# Patient Record
Sex: MALE | Race: WHITE | Employment: FULL TIME | URBAN - METROPOLITAN AREA
[De-identification: names, ages, dates, MRNs, and addresses within clinical notes are randomized per-mention and may not be internally consistent; named-entity substitution may affect disease eponyms.]

---

## 2019-02-23 ENCOUNTER — HOSPITAL ENCOUNTER (EMERGENCY)
Age: 29
Discharge: HOME | End: 2019-02-23
Payer: COMMERCIAL

## 2019-02-23 VITALS
WEIGHT: 190 LBS | RESPIRATION RATE: 20 BRPM | OXYGEN SATURATION: 98 % | HEART RATE: 74 BPM | TEMPERATURE: 97.9 F | BODY MASS INDEX: 25.77 KG/M2 | SYSTOLIC BLOOD PRESSURE: 129 MMHG | DIASTOLIC BLOOD PRESSURE: 75 MMHG

## 2019-02-23 DIAGNOSIS — R42 DIZZINESS: Primary | ICD-10-CM

## 2019-02-23 LAB
BASOPHILS ABSOLUTE: 0.05 E9/L (ref 0–0.2)
BASOPHILS RELATIVE PERCENT: 0.5 % (ref 0–2)
CO2: 24 MMOL/L (ref 22–29)
EOSINOPHILS ABSOLUTE: 0.24 E9/L (ref 0.05–0.5)
EOSINOPHILS RELATIVE PERCENT: 2.5 % (ref 0–6)
GFR AFRICAN AMERICAN: >60
GFR NON-AFRICAN AMERICAN: >60 ML/MIN/1.73
GLUCOSE BLD-MCNC: 102 MG/DL (ref 74–99)
HCT VFR BLD CALC: 43.9 % (ref 37–54)
HEMOGLOBIN: 15.3 G/DL (ref 12.5–16.5)
IMMATURE GRANULOCYTES #: 0.02 E9/L
IMMATURE GRANULOCYTES %: 0.2 % (ref 0–5)
LYMPHOCYTES ABSOLUTE: 2.83 E9/L (ref 1.5–4)
LYMPHOCYTES RELATIVE PERCENT: 29.7 % (ref 20–42)
MCH RBC QN AUTO: 29.3 PG (ref 26–35)
MCHC RBC AUTO-ENTMCNC: 34.9 % (ref 32–34.5)
MCV RBC AUTO: 84.1 FL (ref 80–99.9)
MONOCYTES ABSOLUTE: 0.82 E9/L (ref 0.1–0.95)
MONOCYTES RELATIVE PERCENT: 8.6 % (ref 2–12)
NEUTROPHILS ABSOLUTE: 5.58 E9/L (ref 1.8–7.3)
NEUTROPHILS RELATIVE PERCENT: 58.5 % (ref 43–80)
PDW BLD-RTO: 12.6 FL (ref 11.5–15)
PLATELET # BLD: 286 E9/L (ref 130–450)
PMV BLD AUTO: 9.3 FL (ref 7–12)
POC ANION GAP: 12 MMOL/L (ref 7–16)
POC BUN: 19 MG/DL (ref 8–23)
POC CHLORIDE: 103 MMOL/L (ref 100–108)
POC CREATININE: 1.2 MG/DL (ref 0.7–1.2)
POC POTASSIUM: 4 MMOL/L (ref 3.5–5)
POC SODIUM: 139 MMOL/L (ref 132–146)
RBC # BLD: 5.22 E12/L (ref 3.8–5.8)
WBC # BLD: 9.5 E9/L (ref 4.5–11.5)

## 2019-02-23 PROCEDURE — 85025 COMPLETE CBC W/AUTO DIFF WBC: CPT

## 2019-02-23 PROCEDURE — 80051 ELECTROLYTE PANEL: CPT

## 2019-02-23 PROCEDURE — 36415 COLL VENOUS BLD VENIPUNCTURE: CPT

## 2019-02-23 PROCEDURE — 6370000000 HC RX 637 (ALT 250 FOR IP): Performed by: PHYSICIAN ASSISTANT

## 2019-02-23 PROCEDURE — 99212 OFFICE O/P EST SF 10 MIN: CPT

## 2019-02-23 PROCEDURE — 84520 ASSAY OF UREA NITROGEN: CPT

## 2019-02-23 PROCEDURE — 82565 ASSAY OF CREATININE: CPT

## 2019-02-23 PROCEDURE — 82947 ASSAY GLUCOSE BLOOD QUANT: CPT

## 2019-02-23 RX ORDER — MECLIZINE HCL 12.5 MG/1
25 TABLET ORAL ONCE
Status: COMPLETED | OUTPATIENT
Start: 2019-02-23 | End: 2019-02-23

## 2019-02-23 RX ORDER — PREDNISONE 10 MG/1
TABLET ORAL
Qty: 14 TABLET | Refills: 0 | Status: SHIPPED | OUTPATIENT
Start: 2019-02-23 | End: 2019-12-27 | Stop reason: ALTCHOICE

## 2019-02-23 RX ORDER — MECLIZINE HYDROCHLORIDE CHEWABLE TABLETS 25 MG/1
25 TABLET, CHEWABLE ORAL 3 TIMES DAILY PRN
Qty: 21 TABLET | Refills: 0 | Status: SHIPPED | OUTPATIENT
Start: 2019-02-23

## 2019-02-23 RX ORDER — MECLIZINE HYDROCHLORIDE CHEWABLE TABLETS 25 MG/1
25 TABLET, CHEWABLE ORAL 3 TIMES DAILY PRN
Qty: 90 TABLET | Refills: 0 | Status: SHIPPED | OUTPATIENT
Start: 2019-02-23 | End: 2019-02-23

## 2019-02-23 RX ADMIN — MECLIZINE 25 MG: 12.5 TABLET ORAL at 16:52

## 2019-12-27 ENCOUNTER — APPOINTMENT (OUTPATIENT)
Dept: GENERAL RADIOLOGY | Age: 29
End: 2019-12-27
Payer: COMMERCIAL

## 2019-12-27 ENCOUNTER — HOSPITAL ENCOUNTER (EMERGENCY)
Age: 29
Discharge: HOME | End: 2019-12-27
Payer: COMMERCIAL

## 2019-12-27 VITALS
BODY MASS INDEX: 27.12 KG/M2 | WEIGHT: 200 LBS | DIASTOLIC BLOOD PRESSURE: 84 MMHG | TEMPERATURE: 98 F | HEART RATE: 72 BPM | SYSTOLIC BLOOD PRESSURE: 129 MMHG | RESPIRATION RATE: 20 BRPM | OXYGEN SATURATION: 99 %

## 2019-12-27 DIAGNOSIS — R68.89 FLU-LIKE SYMPTOMS: Primary | ICD-10-CM

## 2019-12-27 LAB — STREP GRP A PCR: NEGATIVE

## 2019-12-27 PROCEDURE — 99213 OFFICE O/P EST LOW 20 MIN: CPT

## 2019-12-27 PROCEDURE — 87880 STREP A ASSAY W/OPTIC: CPT

## 2019-12-27 PROCEDURE — 71046 X-RAY EXAM CHEST 2 VIEWS: CPT

## 2019-12-27 PROCEDURE — 94640 AIRWAY INHALATION TREATMENT: CPT

## 2019-12-27 PROCEDURE — 6370000000 HC RX 637 (ALT 250 FOR IP): Performed by: NURSE PRACTITIONER

## 2019-12-27 RX ORDER — IPRATROPIUM BROMIDE AND ALBUTEROL SULFATE 2.5; .5 MG/3ML; MG/3ML
1 SOLUTION RESPIRATORY (INHALATION) ONCE
Status: COMPLETED | OUTPATIENT
Start: 2019-12-27 | End: 2019-12-27

## 2019-12-27 RX ORDER — ALBUTEROL SULFATE 90 UG/1
2 AEROSOL, METERED RESPIRATORY (INHALATION) EVERY 6 HOURS PRN
Qty: 1 INHALER | Refills: 0 | Status: SHIPPED | OUTPATIENT
Start: 2019-12-27 | End: 2020-12-26

## 2019-12-27 RX ORDER — PREDNISONE 20 MG/1
40 TABLET ORAL DAILY
Qty: 10 TABLET | Refills: 0 | Status: SHIPPED | OUTPATIENT
Start: 2019-12-27 | End: 2020-01-01

## 2019-12-27 RX ADMIN — IPRATROPIUM BROMIDE AND ALBUTEROL SULFATE 1 AMPULE: .5; 3 SOLUTION RESPIRATORY (INHALATION) at 12:17

## 2020-02-01 ENCOUNTER — HOSPITAL ENCOUNTER (OUTPATIENT)
Age: 30
Discharge: HOME | End: 2020-02-01
Payer: COMMERCIAL

## 2020-02-01 LAB
ALBUMIN SERPL-MCNC: 4.8 G/DL (ref 3.5–5.2)
ALP BLD-CCNC: 72 U/L (ref 40–129)
ALT SERPL-CCNC: 27 U/L (ref 0–40)
ANION GAP SERPL CALCULATED.3IONS-SCNC: 12 MMOL/L (ref 7–16)
AST SERPL-CCNC: 21 U/L (ref 0–39)
BASOPHILS ABSOLUTE: 0.04 E9/L (ref 0–0.2)
BASOPHILS RELATIVE PERCENT: 0.5 % (ref 0–2)
BILIRUB SERPL-MCNC: 0.5 MG/DL (ref 0–1.2)
BUN BLDV-MCNC: 15 MG/DL (ref 6–20)
CALCIUM SERPL-MCNC: 9.7 MG/DL (ref 8.6–10.2)
CHLORIDE BLD-SCNC: 103 MMOL/L (ref 98–107)
CHOLESTEROL, TOTAL: 170 MG/DL (ref 0–199)
CO2: 26 MMOL/L (ref 22–29)
CREAT SERPL-MCNC: 1.1 MG/DL (ref 0.7–1.2)
EOSINOPHILS ABSOLUTE: 0.24 E9/L (ref 0.05–0.5)
EOSINOPHILS RELATIVE PERCENT: 2.9 % (ref 0–6)
GFR AFRICAN AMERICAN: >60
GFR NON-AFRICAN AMERICAN: >60 ML/MIN/1.73
GLUCOSE BLD-MCNC: 93 MG/DL (ref 74–99)
HCT VFR BLD CALC: 44 % (ref 37–54)
HDLC SERPL-MCNC: 55 MG/DL
HEMOGLOBIN: 15 G/DL (ref 12.5–16.5)
IMMATURE GRANULOCYTES #: 0.03 E9/L
IMMATURE GRANULOCYTES %: 0.4 % (ref 0–5)
LDL CHOLESTEROL CALCULATED: 102 MG/DL (ref 0–99)
LYMPHOCYTES ABSOLUTE: 2.9 E9/L (ref 1.5–4)
LYMPHOCYTES RELATIVE PERCENT: 34.9 % (ref 20–42)
MCH RBC QN AUTO: 29.1 PG (ref 26–35)
MCHC RBC AUTO-ENTMCNC: 34.1 % (ref 32–34.5)
MCV RBC AUTO: 85.3 FL (ref 80–99.9)
MONOCYTES ABSOLUTE: 0.96 E9/L (ref 0.1–0.95)
MONOCYTES RELATIVE PERCENT: 11.5 % (ref 2–12)
NEUTROPHILS ABSOLUTE: 4.15 E9/L (ref 1.8–7.3)
NEUTROPHILS RELATIVE PERCENT: 49.8 % (ref 43–80)
PDW BLD-RTO: 13 FL (ref 11.5–15)
PLATELET # BLD: 230 E9/L (ref 130–450)
PMV BLD AUTO: 9.2 FL (ref 7–12)
POTASSIUM SERPL-SCNC: 4.3 MMOL/L (ref 3.5–5)
RBC # BLD: 5.16 E12/L (ref 3.8–5.8)
SODIUM BLD-SCNC: 141 MMOL/L (ref 132–146)
T4 FREE: 1.33 NG/DL (ref 0.93–1.7)
TOTAL PROTEIN: 7.1 G/DL (ref 6.4–8.3)
TRIGL SERPL-MCNC: 67 MG/DL (ref 0–149)
TSH SERPL DL<=0.05 MIU/L-ACNC: 1.77 UIU/ML (ref 0.27–4.2)
VITAMIN D 25-HYDROXY: 50 NG/ML (ref 30–100)
VLDLC SERPL CALC-MCNC: 13 MG/DL
WBC # BLD: 8.3 E9/L (ref 4.5–11.5)

## 2020-02-01 PROCEDURE — 85025 COMPLETE CBC W/AUTO DIFF WBC: CPT

## 2020-02-01 PROCEDURE — 36415 COLL VENOUS BLD VENIPUNCTURE: CPT

## 2020-02-01 PROCEDURE — 80053 COMPREHEN METABOLIC PANEL: CPT

## 2020-02-01 PROCEDURE — 82306 VITAMIN D 25 HYDROXY: CPT

## 2020-02-01 PROCEDURE — 80061 LIPID PANEL: CPT

## 2020-02-01 PROCEDURE — 84443 ASSAY THYROID STIM HORMONE: CPT

## 2020-02-01 PROCEDURE — 84439 ASSAY OF FREE THYROXINE: CPT

## 2022-12-08 ENCOUNTER — OFFICE VISIT (OUTPATIENT)
Dept: FAMILY MEDICINE CLINIC | Age: 32
End: 2022-12-08
Payer: COMMERCIAL

## 2022-12-08 VITALS
BODY MASS INDEX: 28.71 KG/M2 | TEMPERATURE: 97.2 F | HEIGHT: 72 IN | WEIGHT: 212 LBS | HEART RATE: 77 BPM | SYSTOLIC BLOOD PRESSURE: 112 MMHG | OXYGEN SATURATION: 99 % | RESPIRATION RATE: 16 BRPM | DIASTOLIC BLOOD PRESSURE: 72 MMHG

## 2022-12-08 DIAGNOSIS — E55.9 VITAMIN D DEFICIENCY: ICD-10-CM

## 2022-12-08 DIAGNOSIS — Z83.438 FAMILY HISTORY OF HYPERLIPIDEMIA: ICD-10-CM

## 2022-12-08 DIAGNOSIS — R53.83 FATIGUE, UNSPECIFIED TYPE: ICD-10-CM

## 2022-12-08 DIAGNOSIS — R68.82 DIMINISHED LIBIDO: ICD-10-CM

## 2022-12-08 DIAGNOSIS — Z00.00 ENCOUNTER FOR MEDICAL EXAMINATION TO ESTABLISH CARE: Primary | ICD-10-CM

## 2022-12-08 DIAGNOSIS — F33.0 MILD EPISODE OF RECURRENT MAJOR DEPRESSIVE DISORDER (HCC): ICD-10-CM

## 2022-12-08 DIAGNOSIS — Z23 FLU VACCINE NEED: ICD-10-CM

## 2022-12-08 LAB
ANION GAP SERPL CALCULATED.3IONS-SCNC: 11 MMOL/L (ref 7–16)
BASOPHILS ABSOLUTE: 0.05 E9/L (ref 0–0.2)
BASOPHILS RELATIVE PERCENT: 0.6 % (ref 0–2)
BUN BLDV-MCNC: 10 MG/DL (ref 6–20)
CALCIUM SERPL-MCNC: 9.8 MG/DL (ref 8.6–10.2)
CHLORIDE BLD-SCNC: 102 MMOL/L (ref 98–107)
CHOLESTEROL, TOTAL: 213 MG/DL (ref 0–199)
CO2: 24 MMOL/L (ref 22–29)
CREAT SERPL-MCNC: 1.1 MG/DL (ref 0.7–1.2)
EOSINOPHILS ABSOLUTE: 0.19 E9/L (ref 0.05–0.5)
EOSINOPHILS RELATIVE PERCENT: 2.3 % (ref 0–6)
GFR SERPL CREATININE-BSD FRML MDRD: >60 ML/MIN/1.73
GLUCOSE BLD-MCNC: 100 MG/DL (ref 74–99)
HCT VFR BLD CALC: 45.2 % (ref 37–54)
HDLC SERPL-MCNC: 60 MG/DL
HEMOGLOBIN: 15.4 G/DL (ref 12.5–16.5)
IMMATURE GRANULOCYTES #: 0.02 E9/L
IMMATURE GRANULOCYTES %: 0.2 % (ref 0–5)
LDL CHOLESTEROL CALCULATED: 139 MG/DL (ref 0–99)
LYMPHOCYTES ABSOLUTE: 2.39 E9/L (ref 1.5–4)
LYMPHOCYTES RELATIVE PERCENT: 28.4 % (ref 20–42)
MCH RBC QN AUTO: 28.9 PG (ref 26–35)
MCHC RBC AUTO-ENTMCNC: 34.1 % (ref 32–34.5)
MCV RBC AUTO: 85 FL (ref 80–99.9)
MONOCYTES ABSOLUTE: 0.82 E9/L (ref 0.1–0.95)
MONOCYTES RELATIVE PERCENT: 9.7 % (ref 2–12)
NEUTROPHILS ABSOLUTE: 4.95 E9/L (ref 1.8–7.3)
NEUTROPHILS RELATIVE PERCENT: 58.8 % (ref 43–80)
PDW BLD-RTO: 12.8 FL (ref 11.5–15)
PLATELET # BLD: 298 E9/L (ref 130–450)
PMV BLD AUTO: 10.4 FL (ref 7–12)
POTASSIUM SERPL-SCNC: 3.8 MMOL/L (ref 3.5–5)
RBC # BLD: 5.32 E12/L (ref 3.8–5.8)
SODIUM BLD-SCNC: 137 MMOL/L (ref 132–146)
TRIGL SERPL-MCNC: 71 MG/DL (ref 0–149)
VITAMIN D 25-HYDROXY: 44 NG/ML (ref 30–100)
VLDLC SERPL CALC-MCNC: 14 MG/DL
WBC # BLD: 8.4 E9/L (ref 4.5–11.5)

## 2022-12-08 PROCEDURE — 90674 CCIIV4 VAC NO PRSV 0.5 ML IM: CPT | Performed by: STUDENT IN AN ORGANIZED HEALTH CARE EDUCATION/TRAINING PROGRAM

## 2022-12-08 PROCEDURE — 90471 IMMUNIZATION ADMIN: CPT | Performed by: STUDENT IN AN ORGANIZED HEALTH CARE EDUCATION/TRAINING PROGRAM

## 2022-12-08 PROCEDURE — 99204 OFFICE O/P NEW MOD 45 MIN: CPT | Performed by: STUDENT IN AN ORGANIZED HEALTH CARE EDUCATION/TRAINING PROGRAM

## 2022-12-08 RX ORDER — FLUTICASONE PROPIONATE 50 MCG
SPRAY, SUSPENSION (ML) NASAL
COMMUNITY
Start: 2022-05-12 | End: 2022-12-08 | Stop reason: ALTCHOICE

## 2022-12-08 RX ORDER — BUPROPION HYDROCHLORIDE 300 MG/1
TABLET ORAL
COMMUNITY
Start: 2022-05-05 | End: 2022-12-08

## 2022-12-08 RX ORDER — DULOXETIN HYDROCHLORIDE 30 MG/1
30 CAPSULE, DELAYED RELEASE ORAL DAILY
Qty: 30 CAPSULE | Refills: 1 | Status: SHIPPED | OUTPATIENT
Start: 2022-12-08

## 2022-12-08 SDOH — ECONOMIC STABILITY: FOOD INSECURITY: WITHIN THE PAST 12 MONTHS, YOU WORRIED THAT YOUR FOOD WOULD RUN OUT BEFORE YOU GOT MONEY TO BUY MORE.: NEVER TRUE

## 2022-12-08 SDOH — ECONOMIC STABILITY: FOOD INSECURITY: WITHIN THE PAST 12 MONTHS, THE FOOD YOU BOUGHT JUST DIDN'T LAST AND YOU DIDN'T HAVE MONEY TO GET MORE.: NEVER TRUE

## 2022-12-08 ASSESSMENT — ENCOUNTER SYMPTOMS
COUGH: 0
NAUSEA: 0
SHORTNESS OF BREATH: 0
RHINORRHEA: 0
ABDOMINAL PAIN: 0
VOMITING: 0
CONSTIPATION: 0
CHEST TIGHTNESS: 0
EYE PAIN: 0
DIARRHEA: 0

## 2022-12-08 ASSESSMENT — SOCIAL DETERMINANTS OF HEALTH (SDOH): HOW HARD IS IT FOR YOU TO PAY FOR THE VERY BASICS LIKE FOOD, HOUSING, MEDICAL CARE, AND HEATING?: NOT HARD AT ALL

## 2022-12-08 ASSESSMENT — PATIENT HEALTH QUESTIONNAIRE - PHQ9
1. LITTLE INTEREST OR PLEASURE IN DOING THINGS: 2
8. MOVING OR SPEAKING SO SLOWLY THAT OTHER PEOPLE COULD HAVE NOTICED. OR THE OPPOSITE, BEING SO FIGETY OR RESTLESS THAT YOU HAVE BEEN MOVING AROUND A LOT MORE THAN USUAL: 3
3. TROUBLE FALLING OR STAYING ASLEEP: 3
SUM OF ALL RESPONSES TO PHQ9 QUESTIONS 1 & 2: 4
10. IF YOU CHECKED OFF ANY PROBLEMS, HOW DIFFICULT HAVE THESE PROBLEMS MADE IT FOR YOU TO DO YOUR WORK, TAKE CARE OF THINGS AT HOME, OR GET ALONG WITH OTHER PEOPLE: 2
9. THOUGHTS THAT YOU WOULD BE BETTER OFF DEAD, OR OF HURTING YOURSELF: 0
SUM OF ALL RESPONSES TO PHQ QUESTIONS 1-9: 21
7. TROUBLE CONCENTRATING ON THINGS, SUCH AS READING THE NEWSPAPER OR WATCHING TELEVISION: 3
2. FEELING DOWN, DEPRESSED OR HOPELESS: 2
4. FEELING TIRED OR HAVING LITTLE ENERGY: 3
6. FEELING BAD ABOUT YOURSELF - OR THAT YOU ARE A FAILURE OR HAVE LET YOURSELF OR YOUR FAMILY DOWN: 2
5. POOR APPETITE OR OVEREATING: 3

## 2022-12-08 ASSESSMENT — COLUMBIA-SUICIDE SEVERITY RATING SCALE - C-SSRS
1. WITHIN THE PAST MONTH, HAVE YOU WISHED YOU WERE DEAD OR WISHED YOU COULD GO TO SLEEP AND NOT WAKE UP?: NO
2. HAVE YOU ACTUALLY HAD ANY THOUGHTS OF KILLING YOURSELF?: NO
6. HAVE YOU EVER DONE ANYTHING, STARTED TO DO ANYTHING, OR PREPARED TO DO ANYTHING TO END YOUR LIFE?: NO

## 2022-12-08 NOTE — PROGRESS NOTES
12/8/2022    [facility]  Chief Complaint   Patient presents with    New Patient    Insomnia    Depression     Positive depression screen    Discuss Medications     Discuss changing Wellbutrin        Paul Castelan is a 33 y.o. male who  has a past medical history of Alcohol abuse, Anxiety, and Depression. Patient is here today to establish care with myself. Patient has allergies to amitriptyline codeine and Levaquin. He has no surgical history. Paul Castelan is a 33 y.o. male who complains of insomnia. Onset was several months ago. Patient describes symptoms as frequent night time awakening. Patient has had no relief with trazodone seroquel or doxepin Associated symptoms include: anxiety and depression. Symptoms have gradually worsened. Depression  Patient complains of depression. He complains of anhedonia, depressed mood, fatigue, and insomnia. Onset was approximately several  years  ago. Symptoms have been gradually worsening since that time. Patient denies psychomotor retardation, recurrent thoughts of death, suicidal attempt, suicidal thoughts with specific plan, and suicidal thoughts without plan. Possible organic causes contributing are:  history of alcohol abuse, but is in recovery at this time . Risk factors: previous episode of depression. Previous treatment includes medication and AA . He complains of the following side effects from the treatment:  most did not work for him . He has tried Lexapro, risperdal, seroquel, lamictal, prozac paxil zoloft trazodone doxepin. He has not tried Celexa, cymbalta. Things did get worse recently due to having a baby 6 months ago. Patient also has issues with his right shoulder and his right foot pain. Believe Cymbalta may help with the depression anxiety along with his pain. Review of Systems   Constitutional:  Positive for fatigue. Negative for chills and fever. HENT:  Negative for congestion, ear pain, postnasal drip and rhinorrhea.     Eyes:  Negative for pain.   Respiratory:  Negative for cough, chest tightness and shortness of breath. Cardiovascular:  Negative for chest pain. Gastrointestinal:  Negative for abdominal pain, constipation, diarrhea, nausea and vomiting. Genitourinary:  Negative for difficulty urinating, dysuria and frequency. Musculoskeletal:  Positive for arthralgias. Skin:  Negative for rash. Neurological:  Negative for dizziness, light-headedness, numbness and headaches. Psychiatric/Behavioral:  Positive for dysphoric mood and sleep disturbance. The patient is nervous/anxious. Prior to Visit Medications    Medication Sig Taking? Authorizing Provider   DULoxetine (CYMBALTA) 30 MG extended release capsule Take 1 capsule by mouth daily Yes Jessica Jimenez, DO        Allergies   Allergen Reactions    Alcohol      Recovering alcoholic    Amitriptyline      Depression / change in personality    Codeine Hives    Levofloxacin      Numbness - in arm       Past Medical History:   Diagnosis Date    Alcohol abuse     Anxiety     Depression        History reviewed. No pertinent surgical history.     Social History     Socioeconomic History    Marital status:      Spouse name: Not on file    Number of children: Not on file    Years of education: Not on file    Highest education level: Not on file   Occupational History    Not on file   Tobacco Use    Smoking status: Every Day     Packs/day: 0.25     Years: 10.00     Pack years: 2.50     Types: E-Cigarettes, Cigarettes    Smokeless tobacco: Never   Vaping Use    Vaping Use: Every day   Substance and Sexual Activity    Alcohol use: No     Comment: sober for a year    Drug use: No     Comment: clean for 9 months- benzo/pain killers    Sexual activity: Not on file   Other Topics Concern    Not on file   Social History Narrative    Not on file     Social Determinants of Health     Financial Resource Strain: Low Risk     Difficulty of Paying Living Expenses: Not hard at all   Food Insecurity: No Food Insecurity    Worried About Running Out of Food in the Last Year: Never true    Ran Out of Food in the Last Year: Never true   Transportation Needs: Not on file   Physical Activity: Not on file   Stress: Not on file   Social Connections: Not on file   Intimate Partner Violence: Not on file   Housing Stability: Not on file        Family History   Problem Relation Age of Onset    Depression Mother     High Blood Pressure Father     Depression Father            Vitals:    12/08/22 1420   BP: 112/72   Pulse: 77   Resp: 16   Temp: 97.2 °F (36.2 °C)   TempSrc: Temporal   SpO2: 99%   Weight: 212 lb (96.2 kg)   Height: 6' (1.829 m)     Estimated body mass index is 28.75 kg/m² as calculated from the following:    Height as of this encounter: 6' (1.829 m). Weight as of this encounter: 212 lb (96.2 kg).     Most Recent Labs  CBC  Lab Results   Component Value Date/Time    WBC 8.3 02/01/2020 11:35 AM    WBC 9.5 02/23/2019 04:50 PM    WBC 7.8 01/23/2018 12:00 PM    RBC 5.16 02/01/2020 11:35 AM    RBC 5.22 02/23/2019 04:50 PM    RBC 5.34 01/23/2018 12:00 PM    HGB 15.0 02/01/2020 11:35 AM    HGB 15.3 02/23/2019 04:50 PM    HGB 15.8 01/23/2018 12:00 PM    HCT 44.0 02/01/2020 11:35 AM    HCT 43.9 02/23/2019 04:50 PM    HCT 46.7 01/23/2018 12:00 PM    MCV 85.3 02/01/2020 11:35 AM    MCV 84.1 02/23/2019 04:50 PM    MCV 87.5 01/23/2018 12:00 PM     02/01/2020 11:35 AM     02/23/2019 04:50 PM     01/23/2018 12:00 PM      CMP  Lab Results   Component Value Date/Time     02/01/2020 11:35 AM     01/23/2018 12:00 PM     01/02/2017 11:45 AM    K 4.3 02/01/2020 11:35 AM    K 4.4 01/23/2018 12:00 PM    K 4.6 01/02/2017 11:45 AM     02/01/2020 11:35 AM     01/23/2018 12:00 PM    CL 97 01/02/2017 11:45 AM    CO2 26 02/01/2020 11:35 AM    CO2 24 02/23/2019 04:57 PM    CO2 24 01/23/2018 12:00 PM    ANIONGAP 12 02/01/2020 11:35 AM    ANIONGAP 13 01/23/2018 12:00 PM    ANIONGAP 15 01/02/2017 11:45 AM    GLUCOSE 93 02/01/2020 11:35 AM    GLUCOSE 87 01/02/2017 11:45 AM    BUN 15 02/01/2020 11:35 AM    BUN 17 01/23/2018 12:00 PM    BUN 20 01/02/2017 11:45 AM    CREATININE 1.1 02/01/2020 11:35 AM    CREATININE 1.2 02/23/2019 04:57 PM    CREATININE 1.1 01/23/2018 12:00 PM    CREATININE 1.1 01/02/2017 11:45 AM    LABGLOM >60 02/01/2020 11:35 AM    LABGLOM >60 02/23/2019 04:57 PM    LABGLOM >60 01/23/2018 12:00 PM    GFRAA >60 02/01/2020 11:35 AM    GFRAA >60 02/23/2019 04:57 PM    GFRAA >60 01/23/2018 12:00 PM    CALCIUM 9.7 02/01/2020 11:35 AM    CALCIUM 9.6 01/23/2018 12:00 PM    CALCIUM 10.4 01/02/2017 11:45 AM    PROT 7.1 02/01/2020 11:35 AM    PROT 7.3 01/23/2018 12:00 PM    PROT 7.9 01/02/2017 11:45 AM    LABALBU 4.8 02/01/2020 11:35 AM    LABALBU 4.7 01/23/2018 12:00 PM    LABALBU 5.0 01/02/2017 11:45 AM    BILITOT 0.5 02/01/2020 11:35 AM    BILITOT 0.6 01/23/2018 12:00 PM    BILITOT 0.5 01/02/2017 11:45 AM    ALKPHOS 72 02/01/2020 11:35 AM    ALKPHOS 66 01/23/2018 12:00 PM    ALKPHOS 76 01/02/2017 11:45 AM    AST 21 02/01/2020 11:35 AM    AST 16 01/23/2018 12:00 PM    AST 20 01/02/2017 11:45 AM    ALT 27 02/01/2020 11:35 AM    ALT 15 01/23/2018 12:00 PM    ALT 13 01/02/2017 11:45 AM     A1C  Lab Results   Component Value Date/Time    LABA1C 5.4 01/02/2017 11:45 AM     TSH  Lab Results   Component Value Date/Time    TSH 1.770 02/01/2020 11:35 AM    TSH 1.880 01/23/2018 12:00 PM    TSH 2.330 01/02/2017 11:45 AM     LIPID  Lab Results   Component Value Date/Time    CHOL 170 02/01/2020 11:35 AM    CHOL 203 01/02/2017 11:45 AM    HDL 55 02/01/2020 11:35 AM    HDL 50 01/23/2018 12:00 PM    HDL 44 01/02/2017 11:45 AM    LDLCALC 102 02/01/2020 11:35 AM    LDLCALC 146 01/23/2018 12:00 PM    LDLCALC 140 01/02/2017 11:45 AM    TRIG 67 02/01/2020 11:35 AM    TRIG 97 01/02/2017 11:45 AM     VITAMIN D  Lab Results   Component Value Date/Time    VITD25 50 02/01/2020 11:35 AM    VITD25 38 01/23/2018 12:00 PM       Physical Exam  Vitals and nursing note reviewed. Constitutional:       Appearance: Normal appearance. HENT:      Head: Normocephalic and atraumatic. Right Ear: External ear normal.      Left Ear: External ear normal.   Eyes:      Extraocular Movements: Extraocular movements intact. Pupils: Pupils are equal, round, and reactive to light. Cardiovascular:      Rate and Rhythm: Normal rate and regular rhythm. Pulses: Normal pulses. Heart sounds: Normal heart sounds. Pulmonary:      Effort: Pulmonary effort is normal.      Breath sounds: Normal breath sounds. Abdominal:      General: Bowel sounds are normal.      Palpations: Abdomen is soft. Musculoskeletal:         General: Normal range of motion. Cervical back: Normal range of motion and neck supple. Skin:     General: Skin is warm and dry. Capillary Refill: Capillary refill takes less than 2 seconds. Neurological:      General: No focal deficit present. Mental Status: He is alert and oriented to person, place, and time. Psychiatric:         Mood and Affect: Mood normal.         Behavior: Behavior normal.         Thought Content: Thought content normal.       ASSESSMENT/PLAN:  Ayesha Al was seen today for new patient, insomnia, depression and discuss medications. Diagnoses and all orders for this visit:    Encounter for medical examination to establish care    Mild episode of recurrent major depressive disorder ([facility] [address]  -     External Referral To Psychiatry  -     DULoxetine (CYMBALTA) 30 MG extended release capsule; Take 1 capsule by mouth daily    Fatigue, unspecified type  -     TSH with Reflex to FT4; Future  -     External Referral To Psychiatry  -     CBC with Auto Differential; Future  -     Basic Metabolic Panel; Future    Vitamin D deficiency  -     Vitamin D 25 Hydroxy;  Future    Diminished libido  -     Testosterone, free, total; Future    Flu vaccine need  -     Influenza, FLUCELVAX, (age 10 mo+), IM, PF, 0.5 mL    Family history of hyperlipidemia  -     Lipid Panel; Future     We will stop the wellbutrin and taper off, and start the cymbalta     As above. Call or go to the nearest ED immediately if symptoms worsen or persist.  Educational materials and/or home exercises printed for patient's review and were included in patient instructions on his/her After Visit Summary and given to patient at the end of visit. Counseled regarding above diagnosis, including possible risks and complications,  especially if left uncontrolled. Counseled regarding the possible side effects, risks, benefits and alternatives to treatment; patient and/or guardian verbalizes understanding, agrees, feels comfortable with and wishes to proceed with above treatment plan. Advised patient to call with any new medication issues, and read all Rx info from pharmacy to assure aware of all possible risks and side effects of medication before taking. Reviewed age and gender appropriate health screening exams and vaccinations. Advised patient regarding importance of keeping up with recommended health maintenance and to schedule as soon as possible if overdue, as this is important in assessing for undiagnosed pathology, especially cancer, as well as protecting against potentially harmful/life threatening disease. Patient and/or guardian verbalizes understanding and agrees with above counseling, assessment and plan. All questions answered. Return in about 6 weeks (around 1/19/2023) for anxiety, depression. An  electronic signature was used to authenticate this note. --Silvio Mcintosh DO on 12/8/2022 at 2:52 PM    This document was prepared at least partially through the use of voice recognition software. Although effort is taken to assure the accuracy of this document, it is possible that grammatical, syntax,  or spelling errors may occur.

## 2022-12-10 LAB
SEX HORMONE BINDING GLOBULIN: 47 NMOL/L (ref 11–80)
TESTOSTERONE FREE-NONMALE: 143.8 PG/ML (ref 47–244)
TESTOSTERONE TOTAL: 788 NG/DL (ref 220–1000)

## 2023-12-23 ENCOUNTER — APPOINTMENT (OUTPATIENT)
Dept: GENERAL RADIOLOGY | Age: 33
End: 2023-12-23
Payer: COMMERCIAL

## 2023-12-23 ENCOUNTER — HOSPITAL ENCOUNTER (EMERGENCY)
Age: 33
Discharge: HOME | End: 2023-12-23
Attending: EMERGENCY MEDICINE
Payer: COMMERCIAL

## 2023-12-23 VITALS
WEIGHT: 210 LBS | HEIGHT: 72 IN | TEMPERATURE: 97.9 F | BODY MASS INDEX: 28.44 KG/M2 | HEART RATE: 92 BPM | SYSTOLIC BLOOD PRESSURE: 126 MMHG | OXYGEN SATURATION: 95 % | RESPIRATION RATE: 17 BRPM | DIASTOLIC BLOOD PRESSURE: 70 MMHG

## 2023-12-23 DIAGNOSIS — R07.89 ATYPICAL CHEST PAIN: Primary | ICD-10-CM

## 2023-12-23 LAB
ANION GAP SERPL CALCULATED.3IONS-SCNC: 13 MMOL/L (ref 7–16)
BASOPHILS # BLD: 0 K/UL (ref 0–0.2)
BASOPHILS NFR BLD: 0 % (ref 0–2)
BUN SERPL-MCNC: 14 MG/DL (ref 6–20)
CALCIUM SERPL-MCNC: 8.9 MG/DL (ref 8.6–10.2)
CHLORIDE SERPL-SCNC: 104 MMOL/L (ref 98–107)
CO2 SERPL-SCNC: 22 MMOL/L (ref 22–29)
CREAT SERPL-MCNC: 1 MG/DL (ref 0.7–1.2)
D DIMER: <200 NG/ML DDU (ref 0–232)
EKG ATRIAL RATE: 96 BPM
EKG P AXIS: 65 DEGREES
EKG P-R INTERVAL: 154 MS
EKG Q-T INTERVAL: 334 MS
EKG QRS DURATION: 80 MS
EKG QTC CALCULATION (BAZETT): 421 MS
EKG R AXIS: 73 DEGREES
EKG T AXIS: 23 DEGREES
EKG VENTRICULAR RATE: 96 BPM
EOSINOPHIL # BLD: 0.35 K/UL (ref 0.05–0.5)
EOSINOPHILS RELATIVE PERCENT: 3 % (ref 0–6)
ERYTHROCYTE [DISTWIDTH] IN BLOOD BY AUTOMATED COUNT: 12.9 % (ref 11.5–15)
GFR SERPL CREATININE-BSD FRML MDRD: >60 ML/MIN/1.73M2
GLUCOSE SERPL-MCNC: 107 MG/DL (ref 74–99)
HCT VFR BLD AUTO: 43.8 % (ref 37–54)
HGB BLD-MCNC: 15.1 G/DL (ref 12.5–16.5)
LYMPHOCYTES NFR BLD: 1.75 K/UL (ref 1.5–4)
LYMPHOCYTES RELATIVE PERCENT: 13 % (ref 20–42)
MCH RBC QN AUTO: 28.4 PG (ref 26–35)
MCHC RBC AUTO-ENTMCNC: 34.5 G/DL (ref 32–34.5)
MCV RBC AUTO: 82.3 FL (ref 80–99.9)
MONOCYTES NFR BLD: 1.4 K/UL (ref 0.1–0.95)
MONOCYTES NFR BLD: 10 % (ref 2–12)
NEUTROPHILS NFR BLD: 74 % (ref 43–80)
NEUTS SEG NFR BLD: 9.9 K/UL (ref 1.8–7.3)
PLATELET # BLD AUTO: 305 K/UL (ref 130–450)
PMV BLD AUTO: 9.2 FL (ref 7–12)
POTASSIUM SERPL-SCNC: 4.1 MMOL/L (ref 3.5–5)
RBC # BLD AUTO: 5.32 M/UL (ref 3.8–5.8)
RBC # BLD: NORMAL 10*6/UL
SODIUM SERPL-SCNC: 139 MMOL/L (ref 132–146)
T4 SERPL-MCNC: 6.5 UG/DL (ref 4.5–11.7)
TROPONIN I SERPL HS-MCNC: <6 NG/L (ref 0–11)
TSH SERPL DL<=0.05 MIU/L-ACNC: 1.24 UIU/ML (ref 0.27–4.2)
WBC OTHER # BLD: 13.4 K/UL (ref 4.5–11.5)

## 2023-12-23 PROCEDURE — 2580000003 HC RX 258

## 2023-12-23 PROCEDURE — 71045 X-RAY EXAM CHEST 1 VIEW: CPT

## 2023-12-23 PROCEDURE — 84484 ASSAY OF TROPONIN QUANT: CPT

## 2023-12-23 PROCEDURE — 84443 ASSAY THYROID STIM HORMONE: CPT

## 2023-12-23 PROCEDURE — 84436 ASSAY OF TOTAL THYROXINE: CPT

## 2023-12-23 PROCEDURE — 80048 BASIC METABOLIC PNL TOTAL CA: CPT

## 2023-12-23 PROCEDURE — 99285 EMERGENCY DEPT VISIT HI MDM: CPT

## 2023-12-23 PROCEDURE — 85025 COMPLETE CBC W/AUTO DIFF WBC: CPT

## 2023-12-23 PROCEDURE — 93010 ELECTROCARDIOGRAM REPORT: CPT | Performed by: INTERNAL MEDICINE

## 2023-12-23 PROCEDURE — 85379 FIBRIN DEGRADATION QUANT: CPT

## 2023-12-23 PROCEDURE — 6360000002 HC RX W HCPCS

## 2023-12-23 PROCEDURE — 96372 THER/PROPH/DIAG INJ SC/IM: CPT

## 2023-12-23 PROCEDURE — 93005 ELECTROCARDIOGRAM TRACING: CPT | Performed by: EMERGENCY MEDICINE

## 2023-12-23 RX ORDER — 0.9 % SODIUM CHLORIDE 0.9 %
1000 INTRAVENOUS SOLUTION INTRAVENOUS ONCE
Status: COMPLETED | OUTPATIENT
Start: 2023-12-23 | End: 2023-12-23

## 2023-12-23 RX ORDER — KETOROLAC TROMETHAMINE 30 MG/ML
30 INJECTION, SOLUTION INTRAMUSCULAR; INTRAVENOUS ONCE
Status: COMPLETED | OUTPATIENT
Start: 2023-12-23 | End: 2023-12-23

## 2023-12-23 RX ADMIN — SODIUM CHLORIDE 1000 ML: 9 INJECTION, SOLUTION INTRAVENOUS at 07:18

## 2023-12-23 RX ADMIN — KETOROLAC TROMETHAMINE 30 MG: 30 INJECTION, SOLUTION INTRAMUSCULAR; INTRAVENOUS at 07:17

## 2023-12-23 NOTE — ED PROVIDER NOTES
well-appearing. He is in no acute distress. He states his pain has improved minimally and is about a 5/10. I discussed lab and imaging results with the patient as well as the plan for discharge with follow-up with his PCP and he agrees with the plan and feels comfortable going home. He states that there may be a component of anxiety. Patient was given [facility] primary care hotline number in order to set up PCP follow-up. Patient's vitals have remained stable and his tachycardia has resolved. Patient is stable for discharge at this time      CONSULTS: (Who and What was discussed)  None      I am the Primary Clinician of Record. FINAL IMPRESSION      1.  Atypical chest pain          DISPOSITION/PLAN     DISPOSITION Decision To Discharge 12/23/2023 08:46:02 AM      PATIENT REFERRED TO:  Primary Care Physician  Please call [address] [place] [address] Drive at [phone] to make an appointment for PCP follow up  Schedule an appointment as soon as possible for a visit   for follow up      DISCHARGE MEDICATIONS:  New Prescriptions    No medications on file       DISCONTINUED MEDICATIONS:  Discontinued Medications    No medications on file              (Please note that portions of this note were completed with a voice recognition program.  Efforts were made to edit the dictations but occasionally words are mis-transcribed.)    Teresita Tucker MD (electronically signed)

## 2025-05-21 ENCOUNTER — OFFICE VISIT (OUTPATIENT)
Dept: URGENT CARE | Age: 35
End: 2025-05-21
Payer: COMMERCIAL

## 2025-05-21 VITALS
TEMPERATURE: 97.8 F | DIASTOLIC BLOOD PRESSURE: 90 MMHG | RESPIRATION RATE: 17 BRPM | HEART RATE: 88 BPM | OXYGEN SATURATION: 98 % | SYSTOLIC BLOOD PRESSURE: 136 MMHG

## 2025-05-21 DIAGNOSIS — N30.00 ACUTE CYSTITIS WITHOUT HEMATURIA: Primary | ICD-10-CM

## 2025-05-21 DIAGNOSIS — N48.89 PENILE PAIN: ICD-10-CM

## 2025-05-21 DIAGNOSIS — N39.9 URINARY PROBLEM IN MALE: ICD-10-CM

## 2025-05-21 LAB
CHLAMYDIA TRACHOMATIS: NOT DETECTED
NEISSERIA GONORRHOEAE: NOT DETECTED
POC APPEARANCE, URINE: CLEAR
POC BILIRUBIN, URINE: NEGATIVE
POC BLOOD, URINE: NEGATIVE
POC COLOR, URINE: NORMAL
POC GLUCOSE, URINE: NEGATIVE MG/DL
POC KETONES, URINE: NEGATIVE MG/DL
POC LEUKOCYTES, URINE: NEGATIVE
POC NITRITE,URINE: NEGATIVE
POC PH, URINE: 6.5 PH
POC PROTEIN, URINE: NEGATIVE MG/DL
POC SPECIFIC GRAVITY, URINE: <=1.005
POC UROBILINOGEN, URINE: 0.2 EU/DL

## 2025-05-21 PROCEDURE — 87801 DETECT AGNT MULT DNA AMPLI: CPT | Performed by: PHYSICIAN ASSISTANT

## 2025-05-21 PROCEDURE — 81003 URINALYSIS AUTO W/O SCOPE: CPT | Performed by: PHYSICIAN ASSISTANT

## 2025-05-21 PROCEDURE — 99204 OFFICE O/P NEW MOD 45 MIN: CPT | Performed by: PHYSICIAN ASSISTANT

## 2025-05-21 RX ORDER — SULFAMETHOXAZOLE AND TRIMETHOPRIM 800; 160 MG/1; MG/1
1 TABLET ORAL 2 TIMES DAILY
Qty: 14 TABLET | Refills: 0 | Status: SHIPPED | OUTPATIENT
Start: 2025-05-21

## 2025-05-21 ASSESSMENT — ENCOUNTER SYMPTOMS
CONSTIPATION: 0
FLANK PAIN: 0
NAUSEA: 0
FEVER: 0
DYSURIA: 1
CHILLS: 0
FREQUENCY: 1
DIARRHEA: 0
HEMATURIA: 0
VOMITING: 0
ABDOMINAL PAIN: 0

## 2025-05-21 NOTE — PROGRESS NOTES
Subjective   Patient ID: Jasson Ferreira is a 34 y.o. male. They present today with a chief complaint of Difficulty Urinating (Presents with compliant of burning with urination for 3 days. ).    History of Present Illness  Patient presents for evaluation of penile discomfort that has been present for the past 3 days.  He states is a burning sensation pretty much constantly.  He has also had slight increased urinary frequency and urgency.  Patient denies any new recent sexual partners.  He states his partner has been symptomatic though was seen via telemedicine and treated for suspected UTI and yeast infection.  Patient states that he is circumcised.  He denies any penile lesions, rashes, sores.  He denies any penile discharge, hematuria, abdominal pain, fevers, chills, testicular pain, testicular swelling, nausea, vomiting.  He does have a history of chlamydia over 3 years ago.  He did not have a test of cure done as he was in inpatient rehab.      Difficulty Urinating  Presenting symptoms: dysuria and penile pain    Presenting symptoms: no penile discharge    Associated symptoms: urinary frequency    Associated symptoms: no abdominal pain, no diarrhea, no fever, no flank pain, no hematuria, no nausea, no penile swelling, no scrotal swelling and no vomiting        Past Medical History  Allergies as of 05/21/2025    (No Known Allergies)       Prescriptions Prior to Admission[1]     Medical History[2]    Surgical History[3]         Review of Systems  Review of Systems   Constitutional:  Negative for chills and fever.   Gastrointestinal:  Negative for abdominal pain, constipation, diarrhea, nausea and vomiting.   Genitourinary:  Positive for dysuria, frequency and penile pain. Negative for flank pain, genital sores, hematuria, penile discharge, penile swelling, scrotal swelling, testicular pain and urgency.                                  Objective    Vitals:    05/21/25 0829   BP: 136/90   Pulse: 88   Resp: 17   Temp:  36.6 °C (97.8 °F)   TempSrc: Oral   SpO2: 98%     No LMP for male patient.    Physical Exam  Vitals reviewed.   Constitutional:       General: He is not in acute distress.  Cardiovascular:      Rate and Rhythm: Normal rate and regular rhythm.      Heart sounds: Normal heart sounds.   Pulmonary:      Effort: Pulmonary effort is normal.      Breath sounds: Normal breath sounds.   Abdominal:      General: Abdomen is flat. Bowel sounds are normal.      Palpations: Abdomen is soft.      Tenderness: There is no abdominal tenderness. There is no right CVA tenderness, left CVA tenderness, guarding or rebound.   Genitourinary:     Comments: Patient declined.  Neurological:      Mental Status: He is alert.         Procedures    Point of Care Test & Imaging Results from this visit  Results for orders placed or performed in visit on 05/21/25   POCT UA Automated manually resulted   Result Value Ref Range    POC Color, Urine Light-Yellow Straw, Yellow, Light-Yellow    POC Appearance, Urine Clear Clear    POC Glucose, Urine NEGATIVE NEGATIVE mg/dl    POC Bilirubin, Urine NEGATIVE NEGATIVE    POC Ketones, Urine NEGATIVE NEGATIVE mg/dl    POC Specific Gravity, Urine <=1.005 1.005 - 1.035    POC Blood, Urine NEGATIVE NEGATIVE    POC PH, Urine 6.5 No Reference Range Established PH    POC Protein, Urine NEGATIVE NEGATIVE mg/dl    POC Urobilinogen, Urine 0.2 0.2, 1.0 EU/DL    Poc Nitrite, Urine NEGATIVE NEGATIVE    POC Leukocytes, Urine NEGATIVE NEGATIVE   POCT WS CHLAMYDIA GONORRHEA PCR BINX manually resulted   Result Value Ref Range    Chlamydia Trachomatis Not Detected Not Detected    Neisseria Gonorrhoeae Not Detected Not Detected      Imaging  No results found.    Cardiology, Vascular, and Other Imaging  No other imaging results found for the past 2 days      Diagnostic study results (if any) were reviewed by Ruby Kathleen PA-C.    Assessment/Plan   Allergies, medications, history, and pertinent labs/EKGs/Imaging reviewed by  Ruby Kathleen PA-C.     Medical Decision Making  Patient presents for evaluation of dysuria and penile discomfort.  He did refuse  examination.  Urinalysis was negative for leukocytes, nitrites or blood.  In-house testing was negative for gonorrhea and chlamydia.  Send out testing performed for trichomonas.  Patient was started presumptively on Bactrim given patient's presence of similar symptoms to cover any infectious etiology.  I did also place referral to urology for further evaluation of symptoms.  Patient was agreeable with this plan and had no questions.    Orders and Diagnoses  Diagnoses and all orders for this visit:  Acute cystitis without hematuria  -     sulfamethoxazole-trimethoprim (Bactrim DS) 800-160 mg tablet; Take 1 tablet by mouth 2 times a day.  Urinary problem in male  -     POCT UA Automated manually resulted  -     Referral to Urology; Future  Penile pain  -     POCT WS CHLAMYDIA GONORRHEA PCR BINX manually resulted  -     Trichomonas vaginalis, Amplified  -     Referral to Urology; Future      Medical Admin Record      Patient disposition: Home    Electronically signed by Ruby Kathleen PA-C  11:16 AM           [1] (Not in a hospital admission)   [2] No past medical history on file.  [3] No past surgical history on file.

## 2025-05-22 LAB — T VAGINALIS RRNA SPEC QL NAA+PROBE: NOT DETECTED

## 2025-06-15 NOTE — PROGRESS NOTES
Subjective   Patient ID: Jasson Ferreira is a 34 y.o. male who presents for EVALUATION OF PAIN IN THE PENIS-- COMES AND GOES.  PAIN IS DISCOMFORT OR BURNING SENSATION.   NO C/O A URETHRAL discharge. PT HAS COMPLETED THREE COURSES OF AN ANTIBIOTIC IN THE PAST  AND NOTED NO REAL CHANGE IN HIS SXS.   HPI:  Are you experiencing:  Burning on urination --YES  Pain on urination  --NO  Urinary frequency -- YES-- WITH CAFFEINE INTAKE  Urinary urgency -- NO  Urge incontinence --NO  Urinary stress incontinence  -- NO  Number of pads used per day --NONE  Enuresis -- NO  Nocturia--0-1 X ON AG  Hematuria --NO  Hesitancy -- NO  Post void fullness --  OCC  Strength of your stream-- WEAK TO MIDERATE    ROS:  General-- No C/O fever or chills  Head-- No C/O Dizziness  Eyes-- NO  C/O blurry or double vision  Ears-- No C/O hearing loss  Neck-- Supple  Chest-- No C/O pain or discomfort  Lungs-- No C/O shortness of breath  Abdomen-- No C/O  pain or discomfort, No nausea or vomiting  Back-- No C/O back pain or discomfort  Extremities-- No C/O swelling or pain    OBJECTIVE  General-- well-developed, well-nourished in NAD  Head-- normal cephalic, atraumatic  Eyes-- PERRL, EOM'S FROM, no jaundice  Neck-- Supple, without masses  Chest-- Normal bony structure  Abdomen-- soft, non tender, liver spleen not palpable. No suprapubic masses.  Back-- no flank masses palpable, no CVA tenderness on palpation or percussion  Lymph nodes-- No inguinal lymphadenopathy noted  Prostate-- 1/2 +, firm, smooth, non-tender, without nodules  Testis-- both down, non tender, without masses   Scrotum-- no hydrocele  Epididymis- no masses palpable  Extremities-- normal mass and strength for the pt's age  Neurological-- pt oriented x 3     URINALYSIS DIPSTICK=-- WNL     COFFEE-- 2-3 LARGE CUPS / DAY  TEA--NO  POP-- 4 / DAY-- DR PEPPER DIET  WATER-- 3-4 BOTTLES PER DAY  ENERGY DRINK-- 3-4 BOTTLES PER  URINALYSIS DIPSTICK--WNL    ASSESSMENT / PLAN  A:  PT'S CURRENT SXS ARE  SECONDARY TO HIS HIGH INTAKE OF CAFFEINE CAUSING A NON SPECIFIC URETHRITIS   PATHOPHYSIOLOGY OF THE ABOVE DISCUSSED IN DETAIL WITH THE PT  ALL QUESTIONS ANSWERED   P:  PT INSTRUCTED TO DRAMATICALLY REDUCE HIS INTAKE OF CAFFEINE  PT TO INCREASE  HIS WATER INTAKE TO 4 -6 EIGHT OZ GLASSES / DAY  FU WITH ME ON AN OPEN DOOR POLICY   Yevgeniy Cano MD 06/15/25 8:48 AM

## 2025-06-19 ENCOUNTER — APPOINTMENT (OUTPATIENT)
Dept: UROLOGY | Facility: CLINIC | Age: 35
End: 2025-06-19

## 2025-06-19 VITALS
BODY MASS INDEX: 29.12 KG/M2 | HEART RATE: 80 BPM | WEIGHT: 215 LBS | SYSTOLIC BLOOD PRESSURE: 134 MMHG | HEIGHT: 72 IN | DIASTOLIC BLOOD PRESSURE: 75 MMHG

## 2025-06-19 DIAGNOSIS — R30.0 DYSURIA: ICD-10-CM

## 2025-06-19 DIAGNOSIS — N39.9 URINARY PROBLEM IN MALE: ICD-10-CM

## 2025-06-19 DIAGNOSIS — R35.0 FREQUENCY OF MICTURITION: Primary | ICD-10-CM

## 2025-06-19 DIAGNOSIS — N48.89 PENILE PAIN: ICD-10-CM

## 2025-06-19 DIAGNOSIS — R35.1 NOCTURIA: ICD-10-CM

## 2025-06-19 LAB
POC APPEARANCE, URINE: CLEAR
POC BILIRUBIN, URINE: NEGATIVE
POC BLOOD, URINE: NEGATIVE
POC COLOR, URINE: YELLOW
POC GLUCOSE, URINE: NEGATIVE MG/DL
POC KETONES, URINE: NEGATIVE MG/DL
POC LEUKOCYTES, URINE: NEGATIVE
POC NITRITE,URINE: NEGATIVE
POC PH, URINE: 6 PH
POC PROTEIN, URINE: NEGATIVE MG/DL
POC SPECIFIC GRAVITY, URINE: 1.01
POC UROBILINOGEN, URINE: 0.2 EU/DL

## 2025-06-19 PROCEDURE — 81003 URINALYSIS AUTO W/O SCOPE: CPT | Performed by: UROLOGY

## 2025-06-19 PROCEDURE — 99204 OFFICE O/P NEW MOD 45 MIN: CPT | Performed by: UROLOGY

## 2025-06-19 PROCEDURE — 3008F BODY MASS INDEX DOCD: CPT | Performed by: UROLOGY

## 2025-06-19 NOTE — LETTER
June 19, 2025     Ruby Kathleen PA-C  [address] [address]    Patient: Jasson Ferreira   YOB: 1990   Date of Visit: 6/19/2025       Dear Dr. Ruby Kathleen PA-C:    Thank you for referring Jasson Ferreira to me for evaluation. Below are my notes for this consultation.  If you have questions, please do not hesitate to call me. I look forward to following your patient along with you.       Sincerely,     Yevgeniy Cano MD      CC: No Recipients  ______________________________________________________________________________________    Subjective  Patient ID: Jasson Ferreira is a 34 y.o. male who presents for EVALUATION OF PAIN IN THE PENIS-- COMES AND GOES.  PAIN IS DISCOMFORT OR BURNING SENSATION.   NO C/O A URETHRAL discharge. PT HAS COMPLETED THREE COURSES OF AN ANTIBIOTIC IN THE PAST  AND NOTED NO REAL CHANGE IN HIS SXS.   HPI:  Are you experiencing:  Burning on urination --YES  Pain on urination  --NO  Urinary frequency -- YES-- WITH CAFFEINE INTAKE  Urinary urgency -- NO  Urge incontinence --NO  Urinary stress incontinence  -- NO  Number of pads used per day --NONE  Enuresis -- NO  Nocturia--0-1 X ON AG  Hematuria --NO  Hesitancy -- NO  Post void fullness --  OCC  Strength of your stream-- WEAK TO MIDERATE    ROS:  General-- No C/O fever or chills  Head-- No C/O Dizziness  Eyes-- NO  C/O blurry or double vision  Ears-- No C/O hearing loss  Neck-- Supple  Chest-- No C/O pain or discomfort  Lungs-- No C/O shortness of breath  Abdomen-- No C/O  pain or discomfort, No nausea or vomiting  Back-- No C/O back pain or discomfort  Extremities-- No C/O swelling or pain    OBJECTIVE  General-- well-developed, well-nourished in NAD  Head-- normal cephalic, atraumatic  Eyes-- PERRL, EOM'S FROM, no jaundice  Neck-- Supple, without masses  Chest-- Normal bony structure  Abdomen-- soft, non tender, liver spleen not palpable. No suprapubic masses.  Back-- no flank masses palpable, no CVA tenderness on palpation  or percussion  Lymph nodes-- No inguinal lymphadenopathy noted  Prostate-- 1/2 +, firm, smooth, non-tender, without nodules  Testis-- both down, non tender, without masses   Scrotum-- no hydrocele  Epididymis- no masses palpable  Extremities-- normal mass and strength for the pt's age  Neurological-- pt oriented x 3     URINALYSIS DIPSTICK=-- WNL     COFFEE-- 2-3 LARGE CUPS / DAY  TEA--NO  POP-- 4 / DAY-- DR PEPPER DIET  WATER-- 3-4 BOTTLES PER DAY  ENERGY DRINK-- 3-4 BOTTLES PER  URINALYSIS DIPSTICK--WNL    ASSESSMENT / PLAN  A:  PT'S CURRENT SXS ARE SECONDARY TO HIS HIGH INTAKE OF CAFFEINE CAUSING A NON SPECIFIC URETHRITIS   PATHOPHYSIOLOGY OF THE ABOVE DISCUSSED IN DETAIL WITH THE PT  ALL QUESTIONS ANSWERED   P:  PT INSTRUCTED TO DRAMATICALLY REDUCE HIS INTAKE OF CAFFEINE  PT TO INCREASE  HIS WATER INTAKE TO 4 -6 EIGHT OZ GLASSES / DAY  FU WITH ME ON AN OPEN DOOR POLICY   Yevgeniy Cano MD 06/15/25 8:48 AM

## 2025-06-30 ENCOUNTER — APPOINTMENT (OUTPATIENT)
Dept: UROLOGY | Facility: CLINIC | Age: 35
End: 2025-06-30